# Patient Record
Sex: MALE | Race: WHITE | URBAN - METROPOLITAN AREA
[De-identification: names, ages, dates, MRNs, and addresses within clinical notes are randomized per-mention and may not be internally consistent; named-entity substitution may affect disease eponyms.]

---

## 2017-07-12 RX ORDER — FLUOCINONIDE 0.5 MG/G
CREAM TOPICAL
Qty: 15 G | Refills: 0 | Status: SHIPPED | OUTPATIENT
Start: 2017-07-12 | End: 2017-07-13 | Stop reason: SDUPTHER

## 2017-07-13 RX ORDER — FLUOCINONIDE 0.5 MG/G
CREAM TOPICAL
Qty: 15 G | Refills: 0 | Status: SHIPPED | OUTPATIENT
Start: 2017-07-13 | End: 2018-09-25 | Stop reason: ALTCHOICE

## 2018-09-25 ENCOUNTER — OFFICE VISIT (OUTPATIENT)
Dept: INTERNAL MEDICINE CLINIC | Age: 33
End: 2018-09-25

## 2018-09-25 VITALS
DIASTOLIC BLOOD PRESSURE: 97 MMHG | HEIGHT: 72 IN | BODY MASS INDEX: 26.84 KG/M2 | RESPIRATION RATE: 16 BRPM | HEART RATE: 64 BPM | TEMPERATURE: 97.9 F | SYSTOLIC BLOOD PRESSURE: 147 MMHG | WEIGHT: 198.2 LBS | OXYGEN SATURATION: 99 %

## 2018-09-25 DIAGNOSIS — B35.3 TINEA PEDIS OF BOTH FEET: Primary | ICD-10-CM

## 2018-09-25 DIAGNOSIS — Z23 ENCOUNTER FOR IMMUNIZATION: ICD-10-CM

## 2018-09-25 DIAGNOSIS — F41.9 ANXIETY: ICD-10-CM

## 2018-09-25 RX ORDER — FLUTICASONE PROPIONATE AND SALMETEROL 500; 50 UG/1; UG/1
1 POWDER RESPIRATORY (INHALATION) EVERY 12 HOURS
COMMUNITY
End: 2018-11-11 | Stop reason: SDUPTHER

## 2018-09-25 RX ORDER — ESCITALOPRAM OXALATE 5 MG/1
5 TABLET ORAL DAILY
Qty: 30 TAB | Refills: 1 | Status: SHIPPED | OUTPATIENT
Start: 2018-09-25 | End: 2018-11-21 | Stop reason: SDUPTHER

## 2018-09-25 RX ORDER — TERBINAFINE HYDROCHLORIDE 250 MG/1
250 TABLET ORAL DAILY
Qty: 15 TAB | Refills: 0 | Status: SHIPPED | OUTPATIENT
Start: 2018-09-25 | End: 2018-10-12 | Stop reason: SDUPTHER

## 2018-09-25 RX ORDER — DOXYCYCLINE 100 MG/1
100 TABLET ORAL DAILY
Qty: 60 TAB | Refills: 0 | Status: SHIPPED | OUTPATIENT
Start: 2018-09-25 | End: 2018-11-24

## 2018-09-25 NOTE — PROGRESS NOTES
Chief Complaint   Patient presents with    Anxiety    Foot Swelling     he is a 28y.o. year old malewho presents for evaluation of   Anxiety and/or Depression  Not controlled on anything and individual therapy. Ongoing symptoms include: sweating, insomnia, racing thoughts, psychomotor agitation, feelings of losing control. Patient denies: suicidal ideation, homocidal ideation. Reported side effects from the treatment: none. 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 Reviewed PmHx, RxHx, FmHx, SocHx, AllgHx and updated and dated in the chart. Family History   Problem Relation Age of Onset    Cancer Mother      thyroid cancer    Cancer Maternal Grandmother      breast cancer     History reviewed. No pertinent past medical history.    Social History     Social History    Marital status:      Spouse name: N/A    Number of children: N/A    Years of education: N/A     Social History Main Topics    Smoking status: Never Smoker    Smokeless tobacco: Never Used    Alcohol use 6.0 oz/week     10 Standard drinks or equivalent per week    Drug use: No    Sexual activity: Yes     Other Topics Concern    None     Social History Narrative        Review of Systems - negative except as listed above      Objective:     Vitals:    09/25/18 0950   BP: (!) 147/97   Pulse: 64   Resp: 16   Temp: 97.9 °F (36.6 °C)   TempSrc: Oral   SpO2: 99%   Weight: 198 lb 3.2 oz (89.9 kg)   Height: 6' (1.829 m)       Physical Examination: General appearance - alert, well appearing, and in no distress  Eyes - pupils equal and reactive, extraocular eye movements intact  Ears - bilateral TM's and external ear canals normal  Nose - normal and patent, no erythema, discharge or polyps  Mouth - mucous membranes moist, pharynx normal without lesions  Neck - supple, no significant adenopathy  Chest - clear to auscultation, no wheezes, rales or rhonchi, symmetric air entry  Heart - normal rate, regular rhythm, normal S1, S2, no murmurs, rubs, clicks or gallops  Abdomen - soft, nontender, nondistended, no masses or organomegaly  Neurological - alert, oriented, normal speech, no focal findings or movement disorder noted  Musculoskeletal - no joint tenderness, deformity or swelling  Extremities - peripheral pulses normal, no pedal edema, no clubbing or cyanosis  Skin - normal coloration and turgor, no rashes, no suspicious skin lesions noted    Assessment/ Plan:   Diagnoses and all orders for this visit:    1. Tinea pedis of both feet    2. Anxiety    3. Encounter for immunization  -     Influenza virus vaccine (QUADRIVALENT PRES FREE SYRINGE) IM (40310)  -     Tetanus, diphtheria toxoids and acellular pertussis (TDAP) vaccine, in individuals >=7 years, IM  -     [place] IMMUNIZ ADMIN,1 SINGLE/COMB VAC/TOXOID    Other orders  -     terbinafine HCl (LAMISIL) 250 mg tablet; Take 1 Tab by mouth daily. -     escitalopram oxalate (LEXAPRO) 5 mg tablet; Take 1 Tab by mouth daily. Follow-up Disposition:  Return in about 2 weeks (around 10/9/2018) for anxiety. I have discussed the diagnosis with the patient and the intended plan as seen in the above orders. The patient has received an after-visit summary and questions were answered concerning future plans. Medication Side Effects and Warnings were discussed with patient: yes  Patient Labs were reviewed and or requested: yes  Patient Past Records were reviewed and or requested  yes  I have discussed the diagnosis with the patient and the intended plan as seen in the above orders. The patient has received an after-visit summary and questions were answered concerning future plans. Pt agrees to call or return to clinic and/or go to closest ER with any worsening of symptoms.   This may include, but not limited to increased fever (>100.4) with NSAIDS or Tylenol, increased edema, confusion, rash, worsening of presenting symptoms. 1) Remember to stay active and/or exercise regularly (I suggest 30-45 minutes daily)   2) For reliable dietary information, go to www. EATRIGHT.org. You may wish to consider seeing the nutritionist at [facility] at #[phone] or [phone], also consider the [address]   3) I routinely suggest a complete physical exam once each year (your birth month)

## 2018-09-25 NOTE — MR AVS SNAPSHOT
[address] [place] [address] [facility] [id number] 
[phone] Patient: Santiago Garibay MRN: [id number] :1985 Visit Information Date & Time Provider Department Dept. Phone Encounter #  
 2018  9:15 AM [facility] MD Geetha Manzo [facility] [phone] [id number] Follow-up Instructions Return in about 2 weeks (around 10/9/2018) for anxiety. Follow-up and Disposition History Upcoming Health Maintenance Date Due DTaP/Tdap/Td series (1 - Tdap) 2005 Influenza Age 5 to Adult 2018 Allergies as of 2018  Review Complete On: 2018 By: [facility] MD Anais  
  
 Severity Noted Reaction Type Reactions Sulfa (Sulfonamide Antibiotics)  2016    Other (comments) Happened when younger Current Immunizations  Never Reviewed Name Date Influenza Vaccine (Quad) PF 2018 Tdap 2018 Not reviewed this visit You Were Diagnosed With   
  
 Codes Comments Tinea pedis of both feet    -  Primary ICD-10-CM: B35.3 ICD-9-CM: 110.4 Anxiety     ICD-10-CM: F41.9 ICD-9-CM: 300.00 Encounter for immunization     ICD-10-CM: M01 ICD-9-CM: V03.89 Vitals BP Pulse Temp Resp Height(growth percentile) Weight(growth percentile) (!) 147/97 (BP 1 Location: Right arm, BP Patient Position: Sitting) 64 97.9 °F (36.6 °C) (Oral) 16 6' (1.829 m) 198 lb 3.2 oz (89.9 kg) SpO2 BMI Smoking Status 99% 26.88 kg/m2 Never Smoker Vitals History BMI and BSA Data Body Mass Index Body Surface Area  
 26.88 kg/m 2 2.14 m 2 Preferred Pharmacy Pharmacy Name Phone [id number] [phone] Your Updated Medication List  
  
   
This list is accurate as of 18 12:33 PM.  Always use your most recent med list.  
  
  
  
  
 ADVAIR DISKUS 500-50 mcg/dose diskus inhaler Generic drug:  fluticasone-salmeterol Take 1 Puff by inhalation every twelve (12) hours. escitalopram oxalate 5 mg tablet Commonly known as:  Pollyann Rutherford Take 1 Tab by mouth daily. terbinafine HCl 250 mg tablet Commonly known as:  LAMISIL Take 1 Tab by mouth daily. Prescriptions Sent to Pharmacy Refills  
 terbinafine HCl (LAMISIL) 250 mg tablet 0 Sig: Take 1 Tab by mouth daily. Class: Normal  
 Pharmacy: [address] [place] [address] Ph #: [phone] Route: Oral  
 escitalopram oxalate (LEXAPRO) 5 mg tablet 1 Sig: Take 1 Tab by mouth daily. Class: Normal  
 Pharmacy: [address] [place] [address] Ph #: [phone] Route: Oral  
  
We Performed the Following INFLUENZA VIRUS VAC QUAD,SPLIT,PRESV FREE SYRINGE IM [id number] CPT(R)] [place] IMMUNIZ ADMIN,1 SINGLE/COMB VAC/TOXOID [id number] CPT(R)] TETANUS, DIPHTHERIA TOXOIDS AND ACELLULAR PERTUSSIS VACCINE (TDAP), IN INDIVIDS. >=7, IM [id number] CPT(R)] Follow-up Instructions Return in about 2 weeks (around 10/9/2018) for anxiety. Introducing [facility]! Dear Adela Jean: 
Thank you for requesting a [employer] account. Our records indicate that you already have an active [employer] account. You can access your account anytime at [email or web address] Did you know that you can access your hospital and ER discharge instructions at any time in [employer]? You can also review all of your test results from your hospital stay or ER visit. Additional Information If you have questions, please visit the Frequently Asked Questions section of the [employer] website at [email or web address] Remember, [employer] is NOT to be used for urgent needs. For medical emergencies, dial 911. Now available from your iPhone and Android! Please provide this summary of care documentation to your next provider. Your primary care clinician is listed as John Walls. If you have any questions after today's visit, please call [phone].

## 2018-10-12 RX ORDER — TERBINAFINE HYDROCHLORIDE 250 MG/1
TABLET ORAL
Qty: 15 TAB | Refills: 0 | Status: SHIPPED | OUTPATIENT
Start: 2018-10-12

## 2018-11-11 RX ORDER — FLUTICASONE PROPIONATE AND SALMETEROL 500; 50 UG/1; UG/1
1 POWDER RESPIRATORY (INHALATION) EVERY 12 HOURS
Qty: 1 INHALER | Refills: 2 | Status: SHIPPED | OUTPATIENT
Start: 2018-11-11 | End: 2019-12-09 | Stop reason: SDUPTHER

## 2018-11-21 RX ORDER — ESCITALOPRAM OXALATE 5 MG/1
TABLET ORAL
Qty: 30 TAB | Refills: 1 | Status: SHIPPED | OUTPATIENT
Start: 2018-11-21 | End: 2019-02-11 | Stop reason: SDUPTHER

## 2019-02-11 RX ORDER — ESCITALOPRAM OXALATE 5 MG/1
TABLET ORAL
Qty: 30 TAB | Refills: 1 | Status: SHIPPED | OUTPATIENT
Start: 2019-02-11 | End: 2019-04-16 | Stop reason: SDUPTHER

## 2019-04-16 RX ORDER — ESCITALOPRAM OXALATE 5 MG/1
TABLET ORAL
Qty: 30 TAB | Refills: 1 | Status: SHIPPED | OUTPATIENT
Start: 2019-04-16 | End: 2019-06-27 | Stop reason: SDUPTHER

## 2019-06-27 RX ORDER — ESCITALOPRAM OXALATE 5 MG/1
TABLET ORAL
Qty: 30 TAB | Refills: 1 | Status: SHIPPED | OUTPATIENT
Start: 2019-06-27 | End: 2020-05-18

## 2019-06-27 RX ORDER — FLUOCINONIDE 1 MG/G
CREAM TOPICAL DAILY
Qty: 30 G | Refills: 0 | Status: SHIPPED | OUTPATIENT
Start: 2019-06-27

## 2019-06-27 NOTE — TELEPHONE ENCOUNTER
Last office visit 9/25/18. Pt requesting refill on cream for eczema (Vanos). Pt states that he was prescribed medication in the past by another Pcp.

## 2019-12-09 RX ORDER — FLUTICASONE PROPIONATE AND SALMETEROL 50; 500 UG/1; UG/1
POWDER RESPIRATORY (INHALATION)
Qty: 1 INHALER | Refills: 3 | Status: SHIPPED | OUTPATIENT
Start: 2019-12-09 | End: 2020-04-06

## 2020-03-31 ENCOUNTER — VIRTUAL VISIT (OUTPATIENT)
Dept: INTERNAL MEDICINE CLINIC | Age: 35
End: 2020-03-31

## 2020-03-31 DIAGNOSIS — J45.909 MILD ASTHMA, UNSPECIFIED WHETHER COMPLICATED, UNSPECIFIED WHETHER PERSISTENT: ICD-10-CM

## 2020-03-31 DIAGNOSIS — F41.8 ANXIETY WITH DEPRESSION: Primary | ICD-10-CM

## 2020-03-31 RX ORDER — ESCITALOPRAM OXALATE 10 MG/1
10 TABLET ORAL DAILY
Qty: 30 TAB | Refills: 3 | Status: SHIPPED | OUTPATIENT
Start: 2020-03-31 | End: 2020-09-28

## 2020-03-31 NOTE — PROGRESS NOTES
Jody Mckeon is a 27 y.o. male who was seen by synchronous (real-time) audio-video technology on 3/31/2020. Consent:  He and/or his healthcare decision maker is aware that this patient-initiated Telehealth encounter is a billable service, with coverage as determined by his insurance carrier. He is aware that he may receive a bill and has provided verbal consent to proceed: Yes    I was in the office while conducting this encounter. Assessment & Plan:   Diagnoses and all orders for this visit:    1. Anxiety with depression    2. Mild asthma, unspecified whether complicated, unspecified whether persistent    Other orders  -     escitalopram oxalate (LEXAPRO) 10 mg tablet; Take 1 Tab by mouth daily. Coding Help - Use CPT Codes 84612-26825, 40228-91471 for Established and New Patients respectively, either employing EM elements or Time rules. Other codes (example consult codes) may also apply. I spent at least 15 minutes with this established patient, and >50% of the time was spent counseling and/or coordinating care regarding depression  712  Subjective: Jody Mckeon was seen for No chief complaint on file. Anxiety/Depression Review:  Patient is seen for anxiety disorder. Current treatment includes SSRI and no other therapies. Ongoing symptoms include: none. Patient denies: Homicidal or suicidal ideations. Reported side effects from the treatment: none. 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 Prior to Admission medications    Medication Sig Start Date End Date Taking? Authorizing Provider   ADVAIR DISKUS 500-50 mcg/dose diskus inhaler USE 1 PUFF EVERY 12 HOURS 12/9/19   China Lira MD   fluocinonide (VANOS) 0.1 % topical cream Apply  to affected area daily.  6/27/19   China Lira MD   escitalopram oxalate (LEXAPRO) 5 mg tablet TAKE 1 TABLET BY MOUTH EVERY DAY 6/27/19   Domingo Santos MD   terbinafine HCl (LAMISIL) 250 mg tablet TAKE 1 TABLET BY MOUTH EVERY DAY 10/12/18   Domingo Santos MD   typhoid vaccin,live,attenuated SR capsule 1 cap on alternate days (day 1, 3, 5, and 7) for a total of 4 doses; acomplete at least 1 week prior to potential exposure 9/25/18   Domingo Santos MD     Allergies   Allergen Reactions    Sulfa (Sulfonamide Antibiotics) Other (comments)     Happened when younger       Current Outpatient Medications   Medication Sig Dispense Refill    ADVAIR DISKUS 500-50 mcg/dose diskus inhaler USE 1 PUFF EVERY 12 HOURS 1 Inhaler 3    fluocinonide (VANOS) 0.1 % topical cream Apply  to affected area daily. 30 g 0    escitalopram oxalate (LEXAPRO) 5 mg tablet TAKE 1 TABLET BY MOUTH EVERY DAY 30 Tab 1    terbinafine HCl (LAMISIL) 250 mg tablet TAKE 1 TABLET BY MOUTH EVERY DAY 15 Tab 0    typhoid vaccin,live,attenuated SR capsule 1 cap on alternate days (day 1, 3, 5, and 7) for a total of 4 doses; acomplete at least 1 week prior to potential exposure 4 Cap 1     Allergies   Allergen Reactions    Sulfa (Sulfonamide Antibiotics) Other (comments)     Happened when younger     No past medical history on file. Past Surgical History:   Procedure Laterality Date    HX CARPAL TUNNEL RELEASE  2013    HX TONSILLECTOMY      pt states when he was a kid   [address] [place] [address] HX TYMPANOSTOMY      when pt was a kid     Family History   Problem Relation Age of Onset    Cancer Mother         thyroid cancer    Cancer Maternal Grandmother         breast cancer       ROS    PHYSICAL EXAMINATION:  [ INSTRUCTIONS:  \"[x]\" Indicates a positive item  \"[]\" Indicates a negative item  -- DELETE ALL ITEMS NOT EXAMINED]  Vital Signs: (As obtained by patient/caregiver at home)  There were no vitals taken for this visit.      Constitutional: [x] Appears well-developed and well-nourished [x] No apparent distress      [] Abnormal -     Mental status: [x] Alert and awake  [x] Oriented to person/place/time [x] Able to follow commands    [] Abnormal -     Eyes:   EOM    [x]  Normal    [] Abnormal -   Sclera  [x]  Normal    [] Abnormal -          Discharge [x]  None visible   [] Abnormal -     HENT: [x] Normocephalic, atraumatic  [] Abnormal -   [x] Mouth/Throat: Mucous membranes are moist    External Ears [x] Normal  [] Abnormal -    Neck: [x] No visualized mass [] Abnormal -     Pulmonary/Chest: [x] Respiratory effort normal   [x] No visualized signs of difficulty breathing or respiratory distress        [] Abnormal -      Musculoskeletal:   [x] Normal gait with no signs of ataxia         [x] Normal range of motion of neck        [] Abnormal -     Neurological:        [x] No Facial Asymmetry (Cranial nerve 7 motor function) (limited exam due to video visit)          [x] No gaze palsy        [] Abnormal -          Skin:        [x] No significant exanthematous lesions or discoloration noted on facial skin         [] Abnormal -            Psychiatric:       [x] Normal Affect [] Abnormal -        [x] No Hallucinations    Other pertinent observable physical exam findings:-        We discussed the expected course, resolution and complications of the diagnosis(es) in detail. Medication risks, benefits, costs, interactions, and alternatives were discussed as indicated. I advised him to contact the office if his condition worsens, changes or fails to improve as anticipated. He expressed understanding with the diagnosis(es) and plan. Pursuant to the emergency declaration under the [facility], [facility] waiver authority and the [employer] Act, this Virtual  Visit was conducted, with patient's consent, to reduce the patient's risk of exposure to COVID-19 and provide continuity of care for an established patient.      Services were provided through a video synchronous discussion virtually to substitute for in-person clinic visit.     Elias Mcqueen MD

## 2020-04-06 RX ORDER — FLUTICASONE PROPIONATE AND SALMETEROL 50; 500 UG/1; UG/1
POWDER RESPIRATORY (INHALATION)
Qty: 60 INHALER | Refills: 3 | Status: SHIPPED | OUTPATIENT
Start: 2020-04-06 | End: 2020-08-31

## 2020-05-18 RX ORDER — ESCITALOPRAM OXALATE 5 MG/1
TABLET ORAL
Qty: 30 TAB | Refills: 1 | Status: SHIPPED | OUTPATIENT
Start: 2020-05-18 | End: 2021-01-18 | Stop reason: DRUGHIGH

## 2020-08-31 RX ORDER — FLUTICASONE PROPIONATE AND SALMETEROL 50; 500 UG/1; UG/1
POWDER RESPIRATORY (INHALATION)
Qty: 60 INHALER | Refills: 3 | Status: SHIPPED | OUTPATIENT
Start: 2020-08-31 | End: 2021-01-07

## 2020-09-28 RX ORDER — ESCITALOPRAM OXALATE 10 MG/1
TABLET ORAL
Qty: 30 TAB | Refills: 3 | Status: SHIPPED | OUTPATIENT
Start: 2020-09-28 | End: 2021-01-18 | Stop reason: DRUGHIGH

## 2021-01-07 RX ORDER — FLUTICASONE PROPIONATE AND SALMETEROL 500; 50 UG/1; UG/1
POWDER RESPIRATORY (INHALATION)
Qty: 1 INHALER | Refills: 3 | Status: SHIPPED | OUTPATIENT
Start: 2021-01-07

## 2021-01-18 ENCOUNTER — VIRTUAL VISIT (OUTPATIENT)
Dept: INTERNAL MEDICINE CLINIC | Age: 36
End: 2021-01-18

## 2021-01-18 DIAGNOSIS — F41.8 ANXIETY WITH DEPRESSION: Primary | ICD-10-CM

## 2021-01-18 PROCEDURE — 99214 OFFICE O/P EST MOD 30 MIN: CPT | Performed by: FAMILY MEDICINE

## 2021-01-18 RX ORDER — BUPROPION HYDROCHLORIDE 150 MG/1
150 TABLET ORAL
Qty: 30 TAB | Refills: 0 | Status: SHIPPED | OUTPATIENT
Start: 2021-01-18 | End: 2021-02-10

## 2021-02-10 RX ORDER — BUPROPION HYDROCHLORIDE 150 MG/1
TABLET ORAL
Qty: 30 TAB | Refills: 0 | Status: SHIPPED | OUTPATIENT
Start: 2021-02-10

## 2021-07-30 PROBLEM — F41.8 ANXIETY WITH DEPRESSION: Status: ACTIVE | Noted: 2021-07-30

## 2022-03-18 PROBLEM — F41.8 ANXIETY WITH DEPRESSION: Status: ACTIVE | Noted: 2021-07-30

## 2023-05-19 RX ORDER — FLUOCINONIDE 1 MG/G
CREAM TOPICAL DAILY
COMMUNITY
Start: 2019-06-27

## 2023-05-19 RX ORDER — FLUTICASONE PROPIONATE AND SALMETEROL 500; 50 UG/1; UG/1
POWDER RESPIRATORY (INHALATION)
COMMUNITY
Start: 2021-01-07

## 2023-05-19 RX ORDER — TERBINAFINE HYDROCHLORIDE 250 MG/1
1 TABLET ORAL DAILY
COMMUNITY
Start: 2018-10-12

## 2023-05-19 RX ORDER — BUPROPION HYDROCHLORIDE 150 MG/1
1 TABLET ORAL EVERY MORNING
COMMUNITY
Start: 2021-02-10